# Patient Record
Sex: MALE | Race: BLACK OR AFRICAN AMERICAN | NOT HISPANIC OR LATINO | Employment: STUDENT | URBAN - NONMETROPOLITAN AREA
[De-identification: names, ages, dates, MRNs, and addresses within clinical notes are randomized per-mention and may not be internally consistent; named-entity substitution may affect disease eponyms.]

---

## 2017-02-28 ENCOUNTER — OFFICE VISIT (OUTPATIENT)
Dept: ORTHOPEDIC SURGERY | Facility: CLINIC | Age: 22
End: 2017-02-28

## 2017-02-28 VITALS — HEIGHT: 69 IN | RESPIRATION RATE: 16 BRPM | WEIGHT: 171 LBS | BODY MASS INDEX: 25.33 KG/M2

## 2017-02-28 DIAGNOSIS — S62.345A CLOSED NONDISPLACED FRACTURE OF BASE OF FOURTH METACARPAL BONE OF LEFT HAND, INITIAL ENCOUNTER: Primary | ICD-10-CM

## 2017-02-28 DIAGNOSIS — T14.8XXA FRACTURE: ICD-10-CM

## 2017-02-28 PROCEDURE — 99203 OFFICE O/P NEW LOW 30 MIN: CPT | Performed by: PHYSICIAN ASSISTANT

## 2017-02-28 PROCEDURE — 29075 APPL CST ELBW FNGR SHORT ARM: CPT | Performed by: PHYSICIAN ASSISTANT

## 2017-02-28 RX ORDER — HYDROCODONE BITARTRATE AND ACETAMINOPHEN 5; 325 MG/1; MG/1
TABLET ORAL
Refills: 0 | COMMUNITY
Start: 2017-02-17 | End: 2018-03-22

## 2017-02-28 NOTE — PROGRESS NOTES
Subjective   Patient ID: Fuentes Madrigal is a 21 y.o. left hand dominant male is here today for a treatment planning discussion.  Pain of the Left Hand         History of Present Illness  Patient presents to the office as a new patient for an initial evaluation of right hand fracture.  He states on 2/12/2017 he punched a wall.  He was seen by the [facility] sports medicine clinic and was placed in a boxer's cast.  He states the cast was removed yesterday in order to have an outpatient x-ray to evaluate healing.  He was then placed into a boxer's Ortho-Glass splint.  He wanted to be evaluated by orthopedics because he notes stiffness along the fourth finger after being in a cast.  He denies any deformity to the fingers when he punched a wall.     Pain Score: worst possible pain  Pain Location: Hand  Pain Orientation: Left     Pain Descriptors: Aching        Date Pain First Started: 02/12/17     Aggravating Factors: Bending, Straightening           Result of Injury: Yes (patient punched a wall.)  Work-Related Injury: No    Past Medical History   Diagnosis Date   • Boxer's fracture         Past Surgical History   Procedure Laterality Date   • Knee acl reconstruction Left    • Back surgery         Family History   Problem Relation Age of Onset   • Heart failure Maternal Grandmother         Social History     Social History   • Marital status: Single     Spouse name: N/A   • Number of children: N/A   • Years of education: N/A     Occupational History   • Not on file.     Social History Main Topics   • Smoking status: Never Smoker   • Smokeless tobacco: Not on file   • Alcohol use No   • Drug use: No   • Sexual activity: Defer     Other Topics Concern   • Not on file     Social History Narrative       No Known Allergies    Review of Systems   Constitutional: Positive for activity change. Negative for appetite change, chills, diaphoresis, fatigue, fever and unexpected weight change.   HENT: Negative.    Eyes: Negative.   "  Respiratory: Negative for apnea, cough, choking, chest tightness, shortness of breath, wheezing and stridor.    Cardiovascular: Negative for chest pain, palpitations and leg swelling.   Gastrointestinal: Negative.    Endocrine: Negative.    Genitourinary: Negative.    Musculoskeletal: Positive for arthralgias. Negative for back pain, gait problem, joint swelling, myalgias, neck pain and neck stiffness.   Skin: Negative.    Allergic/Immunologic: Negative.    Neurological: Negative.    Hematological: Negative.    Psychiatric/Behavioral: Negative.        Objective   Visit Vitals   • Resp 16   • Ht 69\" (175.3 cm)   • Wt 171 lb (77.6 kg)   • BMI 25.25 kg/m2      Physical Exam   Constitutional: He is oriented to person, place, and time. He appears well-developed and well-nourished.   HENT:   Head: Normocephalic.   Eyes: Conjunctivae are normal.   Neck: No tracheal deviation present.   Pulmonary/Chest: Effort normal.   Musculoskeletal:        Left elbow: He exhibits normal range of motion and no swelling. No tenderness found.        Left wrist: He exhibits normal range of motion, no tenderness, no bony tenderness, no swelling and no effusion.        Left hand: He exhibits decreased range of motion (the 4th phalanx has a slight flexion lag noted to the MIP joint) and swelling. He exhibits no tenderness, normal capillary refill and no laceration. Normal sensation noted. Normal strength noted.   Neurological: He is alert and oriented to person, place, and time.   Skin: No rash noted.   Psychiatric: He has a normal mood and affect. His behavior is normal.   Vitals reviewed.    Ortho Exam     Neurologic Exam     Mental Status   Oriented to person, place, and time.      Ortho Exam   There is a good brisk cap refill.  Patient notes some stiffness when trying to extend and flex the fourth digit.  When passively extended and flexed the patient is able to keep it in this position.    Assessment/Plan   Independent Review of " Radiographic Studies:    No new imaging done today.  Laboratory and Other Studies:  No new results reviewed today.     I did review the x-ray of the left hand performed 2/27/2017 from the outpatient setting.  There is a slightly angulated base of the fourth metacarpal fracture.  There is very mild callus formation noted.    Procedures  [x] No procedures were performed in office today.    Fuentes was seen today for pain.    Diagnoses and all orders for this visit:    Closed nondisplaced fracture of base of fourth metacarpal bone of left hand, initial encounter    Fracture       Orthopedic activities reviewed and patient expressed appreciation  Discussion of orthopedic goals  Risk, benefits, and merits of treatment alternatives reviewed with the patient and questions answered  Elevate arm for residual swelling  Reduced physical activity as appropriate  Weight bearing parameters reviewed  Avoid offending activity    Recommendations/Plan:   Patient is encouraged to call or return for any issues or concerns.  Patient was placed in a fiberglass ulnar gutter cast with the mobilization of the third fourth and fifth digits.  The third fourth and fifth digits were slightly flexed at approximately 10° flexion.  I would like to immobilize him in a cast for an additional 2 weeks.  I would then like to enroll him in formal physical hand therapy to provide better mobilization and strength to the fourth finger.  I have a low suspicion for tendon injury.  FU in 2 weeks XOA  Patient agreeable to call or return sooner for any concerns.

## 2017-03-07 DIAGNOSIS — Z09 FOLLOW UP: Primary | ICD-10-CM

## 2017-03-09 ENCOUNTER — OFFICE VISIT (OUTPATIENT)
Dept: ORTHOPEDIC SURGERY | Facility: CLINIC | Age: 22
End: 2017-03-09

## 2017-03-09 VITALS — RESPIRATION RATE: 20 BRPM | WEIGHT: 175 LBS | HEIGHT: 69 IN | BODY MASS INDEX: 25.92 KG/M2

## 2017-03-09 DIAGNOSIS — S62.345D CLOSED NONDISPLACED FRACTURE OF BASE OF FOURTH METACARPAL BONE OF LEFT HAND WITH ROUTINE HEALING, SUBSEQUENT ENCOUNTER: Primary | ICD-10-CM

## 2017-03-09 PROCEDURE — 99213 OFFICE O/P EST LOW 20 MIN: CPT | Performed by: PHYSICIAN ASSISTANT

## 2017-03-09 NOTE — PROGRESS NOTES
Subjective   Patient ID: Fuentes Madrigal is a 21 y.o.  male   Follow-up of the Left Hand (Patient states his school removed the cast for him yesterday. Patient denies any pain )         History of Present Illness   Patient was seen in the office for routine scheduled follow-up in regards to left hand fracture.  He is pleased to report no pain.  He states he no longer has stiffness to the fingers.  He is able to bend the fingers without difficulty.  He states his wrist feels slightly weak but as he is moving it during this office visit is improving.  He denies numbness or tingling.  Patient presents to the office as a new patient for an initial evaluation of right hand fracture. He states on 2/12/2017 he punched a wall. He was seen by the [facility] sports medicine clinic and was placed in a boxer's cast.  The cast was then removed partially 2 weeks later from the outpatient [facility] facility in order to have repeat x-rays.  At that point he decided to see orthopedics.  He came to our office for an initial evaluation in a Ortho-Glass splint.  I placed him in a boxer's cast fiberglass at his last office visit.      Past Medical History   Diagnosis Date   • Boxer's fracture         Past Surgical History   Procedure Laterality Date   • Knee acl reconstruction Left    • Back surgery         Family History   Problem Relation Age of Onset   • Heart failure Maternal Grandmother         Social History     Social History   • Marital status: Single     Spouse name: N/A   • Number of children: N/A   • Years of education: N/A     Occupational History   • Not on file.     Social History Main Topics   • Smoking status: Never Smoker   • Smokeless tobacco: Not on file   • Alcohol use No   • Drug use: No   • Sexual activity: Defer     Other Topics Concern   • Not on file     Social History Narrative       No Known Allergies    Review of Systems   Constitutional: Negative for fever.   HENT: Negative for voice change.    Eyes: Negative for visual  "disturbance.   Respiratory: Negative for shortness of breath.    Cardiovascular: Negative for chest pain.   Gastrointestinal: Negative for abdominal distention and abdominal pain.   Genitourinary: Negative for dysuria.   Musculoskeletal: Positive for arthralgias. Negative for gait problem and joint swelling.   Skin: Negative for rash.   Neurological: Negative for speech difficulty.   Hematological: Does not bruise/bleed easily.   Psychiatric/Behavioral: Negative for confusion.       Objective   Visit Vitals   • Resp 20   • Ht 69\" (175.3 cm)   • Wt 175 lb (79.4 kg)   • BMI 25.84 kg/m2      Physical Exam   Constitutional: He is oriented to person, place, and time. He appears well-developed.   HENT:   Head: Normocephalic.   Eyes: Conjunctivae are normal.   Neck: No tracheal deviation present.   Musculoskeletal:        Left elbow: He exhibits no swelling. No tenderness found.        Left wrist: He exhibits no tenderness, no bony tenderness and no swelling.        Left hand: He exhibits normal range of motion, no tenderness, normal capillary refill, no deformity and no swelling. Normal strength noted.   Neurological: He is alert and oriented to person, place, and time.   Psychiatric: He has a normal mood and affect. His behavior is normal.   Vitals reviewed.    Right Hand Exam     Range of Motion   The patient has normal right wrist ROM.     Muscle Strength   The patient has normal right wrist strength.    Tests   Phalen’s Sign: negative    Other   Sensation: normal  Pulse: present            Neurologic Exam     Mental Status   Oriented to person, place, and time.      Right Hand/Wrist Exam     Range of Motion   Range of motion is normal right wrist ROM.    Muscle Strength   Normal right wrist strength            Assessment/Plan   Independent Review of Radiographic Studies:    Indication to evaluate fracture healing, and compared with prior imaging, shows interm fracture healing, callus formation and or periostitis in " continued good position and alignment.  Laboratory and Other Studies:  No new results reviewed today.       Procedures  [x] No procedures were performed in office today.    Fuentes was seen today for follow-up.    Diagnoses and all orders for this visit:    Closed nondisplaced fracture of base of fourth metacarpal bone of left hand with routine healing, subsequent encounter     Orthopedic activities reviewed and patient expressed appreciation  Discussion of orthopedic goals  Risk, benefits, and merits of treatment alternatives reviewed with the patient and questions answered  Reduced physical activity as appropriate  Ice, heat, and/or modalities as beneficial    Recommendations/Plan:  Exercise, medications, injections, other patient advice, and return appointment as noted.  Patient is encouraged to call or return for any issues or concerns.    FU as needed  Patient agreeable to call or return sooner for any concerns.

## 2018-03-22 ENCOUNTER — OFFICE VISIT (OUTPATIENT)
Dept: ORTHOPEDIC SURGERY | Facility: CLINIC | Age: 23
End: 2018-03-22

## 2018-03-22 VITALS — BODY MASS INDEX: 27.11 KG/M2 | WEIGHT: 183 LBS | RESPIRATION RATE: 16 BRPM | HEIGHT: 69 IN

## 2018-03-22 DIAGNOSIS — S76.312A HAMSTRING MUSCLE STRAIN, LEFT, INITIAL ENCOUNTER: Primary | ICD-10-CM

## 2018-03-22 DIAGNOSIS — S76.302A HAMSTRING INJURY, LEFT, INITIAL ENCOUNTER: ICD-10-CM

## 2018-03-22 PROCEDURE — 99213 OFFICE O/P EST LOW 20 MIN: CPT | Performed by: PHYSICIAN ASSISTANT

## 2018-03-22 NOTE — PROGRESS NOTES
Subjective   Patient ID: Fuentes Madrigal is a 23 y.o.    male  Pain of the Left Knee (Patient states injured left knee radiating up leg on Feb 25, 2018 after playing football, he did not seek care anywhere. Patient states continues to hurt especially after lifting weights. Patient is left hand dominant.)         History of Present Illness  Patient presents with complaints of left posterior thigh pain.  He states he initially overstretched his left upper thigh while playing football.  He has tried rest, warm heat, ice and home exercises with no relief.  He states he still has pain especially after lifting weights and performing leg lifts.  He denies numbness or tingling.  Denies back pain.  He states initially after his injury he had bruising to the posterior thigh but this subsided.      Pain Score: 1  Pain Location: Knee  Pain Orientation: Left     Pain Descriptors: Aching  Pain Frequency: Intermittent     Date Pain First Started: 02/25/18     Aggravating Factors: Bending, Stretching, Standing, Walking, Squatting           Result of Injury: Yes (playing football)  Work-Related Injury: No    Past Medical History:   Diagnosis Date   • Boxer's fracture         Past Surgical History:   Procedure Laterality Date   • BACK SURGERY     • KNEE ACL RECONSTRUCTION Left        Family History   Problem Relation Age of Onset   • Heart failure Maternal Grandmother        Social History     Social History   • Marital status: Single     Spouse name: N/A   • Number of children: N/A   • Years of education: N/A     Occupational History   • Not on file.     Social History Main Topics   • Smoking status: Never Smoker   • Smokeless tobacco: Not on file   • Alcohol use No   • Drug use: No   • Sexual activity: Defer     Other Topics Concern   • Not on file     Social History Narrative   • No narrative on file       No Known Allergies    Review of Systems   Constitutional: Negative for fever.   HENT: Negative for voice change.    Eyes:  "Negative for visual disturbance.   Respiratory: Negative for shortness of breath.    Cardiovascular: Negative for chest pain.   Gastrointestinal: Negative for abdominal distention and abdominal pain.   Genitourinary: Negative for dysuria.   Musculoskeletal: Positive for arthralgias. Negative for gait problem and joint swelling.   Skin: Negative for rash.   Neurological: Negative for speech difficulty.   Hematological: Does not bruise/bleed easily.   Psychiatric/Behavioral: Negative for confusion.   All other systems reviewed and are negative.      Objective   Resp 16   Ht 175.3 cm (69.02\")   Wt 83 kg (183 lb)   BMI 27.01 kg/m²    Physical Exam   Constitutional: He is oriented to person, place, and time. He appears well-nourished.   Neck: No tracheal deviation present.   Pulmonary/Chest: Effort normal. No respiratory distress.   Musculoskeletal:        Left hip: He exhibits no tenderness.        Left knee: He exhibits no swelling, no effusion, no ecchymosis, no deformity, no laceration and no erythema. No medial joint line and no lateral joint line tenderness noted.        Left upper leg: He exhibits tenderness (hamstring  muscles/ lateral tendon\). He exhibits no swelling, no edema, no deformity and no laceration.   Neurological: He is alert and oriented to person, place, and time.   Skin: Capillary refill takes less than 2 seconds.   Psychiatric: He has a normal mood and affect. His behavior is normal.   Vitals reviewed.    Left Knee Exam     Range of Motion   Extension: 0   Flexion: 130     Tests   Nellie:  Medial - negative Lateral - negative  Drawer:       Anterior - negative     Posterior - negative  Pivot Shift: negative    Other   Erythema: absent  Sensation: normal  Effusion: no effusion present      Left Hip Exam     Range of Motion   Flexion: 110   Abduction: normal     Tests   MARIO ALBERTO: negative  Fadir:  Negative FADIR test           Extremity DVT signs are Negative on physical exam with negative Bruno " sign, with no calf pain, with no palpable cords, with no increased pain with passive stretch/extension and with no skin tone change   Neurologic Exam     Mental Status   Oriented to person, place, and time.      Left Knee Exam     Tenderness   The patient is experiencing tenderness in the no medial joint line, no lateral joint line.               Assessment/Plan   Independent Review of Radiographic Studies:    Shows no acute fracture or dislocation.      Procedures       Fuentes was seen today for pain.    Diagnoses and all orders for this visit:    Hamstring muscle strain, left, initial encounter  -     XR Knee 1 or 2 View Left    Hamstring injury, left, initial encounter  -     Ambulatory Referral to Physical Therapy Evaluate and treat, Ortho  -     MRI Femur Left Without Contrast       Orthopedic activities reviewed and patient expressed appreciation  Discussion of orthopedic goals  Risk, benefits, and merits of treatment alternatives reviewed with the patient and questions answered  Physical therapy referral given  Ice, heat, and/or modalities as beneficial    Recommendations/Plan:  Patient is encouraged to call or return for any issues or concerns.    FU after MRI     Patient agreeable to call or return sooner for any concerns.

## 2019-03-25 ENCOUNTER — APPOINTMENT (OUTPATIENT)
Dept: GENERAL RADIOLOGY | Facility: HOSPITAL | Age: 24
End: 2019-03-25

## 2019-03-25 ENCOUNTER — HOSPITAL ENCOUNTER (EMERGENCY)
Facility: HOSPITAL | Age: 24
Discharge: HOME | End: 2019-03-25
Attending: EMERGENCY MEDICINE | Admitting: EMERGENCY MEDICINE

## 2019-03-25 VITALS
TEMPERATURE: 97.2 F | DIASTOLIC BLOOD PRESSURE: 92 MMHG | SYSTOLIC BLOOD PRESSURE: 151 MMHG | OXYGEN SATURATION: 98 % | WEIGHT: 180.2 LBS | BODY MASS INDEX: 27.31 KG/M2 | RESPIRATION RATE: 18 BRPM | HEART RATE: 47 BPM | HEIGHT: 68 IN

## 2019-03-25 DIAGNOSIS — M54.6 ACUTE MIDLINE THORACIC BACK PAIN: Primary | ICD-10-CM

## 2019-03-25 DIAGNOSIS — M79.641 BILATERAL HAND PAIN: ICD-10-CM

## 2019-03-25 DIAGNOSIS — M79.642 BILATERAL HAND PAIN: ICD-10-CM

## 2019-03-25 PROCEDURE — 99282 EMERGENCY DEPT VISIT SF MDM: CPT

## 2019-03-25 PROCEDURE — 73130 X-RAY EXAM OF HAND: CPT

## 2019-03-25 PROCEDURE — 72070 X-RAY EXAM THORAC SPINE 2VWS: CPT

## 2019-03-25 RX ORDER — IBUPROFEN 800 MG/1
800 TABLET ORAL ONCE
Status: COMPLETED | OUTPATIENT
Start: 2019-03-25 | End: 2019-03-25

## 2019-03-25 RX ADMIN — IBUPROFEN 800 MG: 800 TABLET, FILM COATED ORAL at 18:40
